# Patient Record
Sex: FEMALE | Race: WHITE | URBAN - METROPOLITAN AREA
[De-identification: names, ages, dates, MRNs, and addresses within clinical notes are randomized per-mention and may not be internally consistent; named-entity substitution may affect disease eponyms.]

---

## 2017-10-06 LAB
HPV COMMENT: ABNORMAL
HPV TYPE 16: NOT DETECTED
HPV TYPE 18: NOT DETECTED
HPVOH (OTHER TYPES): DETECTED

## 2017-10-17 ENCOUNTER — OFFICE VISIT (OUTPATIENT)
Dept: FAMILY MEDICINE CLINIC | Age: 22
End: 2017-10-17

## 2017-10-17 VITALS
SYSTOLIC BLOOD PRESSURE: 110 MMHG | TEMPERATURE: 98.9 F | OXYGEN SATURATION: 98 % | BODY MASS INDEX: 21.77 KG/M2 | DIASTOLIC BLOOD PRESSURE: 80 MMHG | WEIGHT: 119 LBS | HEART RATE: 70 BPM

## 2017-10-17 DIAGNOSIS — R42 VERTIGO: Primary | ICD-10-CM

## 2017-10-17 DIAGNOSIS — R11.0 NAUSEA: ICD-10-CM

## 2017-10-17 LAB
CONTROL: NORMAL
PREGNANCY TEST URINE, POC: NEGATIVE

## 2017-10-17 PROCEDURE — 1036F TOBACCO NON-USER: CPT | Performed by: FAMILY MEDICINE

## 2017-10-17 PROCEDURE — 99214 OFFICE O/P EST MOD 30 MIN: CPT | Performed by: FAMILY MEDICINE

## 2017-10-17 PROCEDURE — 81025 URINE PREGNANCY TEST: CPT | Performed by: FAMILY MEDICINE

## 2017-10-17 PROCEDURE — G8420 CALC BMI NORM PARAMETERS: HCPCS | Performed by: FAMILY MEDICINE

## 2017-10-17 PROCEDURE — G8484 FLU IMMUNIZE NO ADMIN: HCPCS | Performed by: FAMILY MEDICINE

## 2017-10-17 PROCEDURE — G8427 DOCREV CUR MEDS BY ELIG CLIN: HCPCS | Performed by: FAMILY MEDICINE

## 2017-10-17 RX ORDER — LEVONORGESTREL AND ETHINYL ESTRADIOL 0.15-0.03
1 KIT ORAL DAILY
COMMUNITY
End: 2021-10-19 | Stop reason: SDUPTHER

## 2017-10-17 RX ORDER — MECLIZINE HYDROCHLORIDE 25 MG/1
25 TABLET ORAL 3 TIMES DAILY PRN
Qty: 30 TABLET | Refills: 1 | Status: SHIPPED | OUTPATIENT
Start: 2017-10-17 | End: 2017-10-27

## 2017-10-17 ASSESSMENT — ENCOUNTER SYMPTOMS
SINUS PRESSURE: 0
TROUBLE SWALLOWING: 0
SINUS PAIN: 0

## 2017-10-17 NOTE — PROGRESS NOTES
Melissa Bocanegra  : 1995  Encounter date: 10/17/2017    This is a 25 y.o. female who presents with  Chief Complaint   Patient presents with    Dizziness     Dizzy x 1 1/2 weeks, nauseated. Dizzy feeling is at random times. History of present illness:    Dizzy when standing up but also coming randomly. Feels like imbalance or \"spins after too much drinking\". Nauseous when she eats or doesn't eat and it follows dizziness. Getting motion sick in vehicles when not ; not normal for her. If lying down and then stands up she feels it. When standing it sometimes hits her. Doesn't feel like she is going to pass out. Did ride rollercoasters on the 6th; felt sick on rollercoasters which isn't normal for her. Started running intensely week after - training for marathon. Feels worse when she runs outside; more stomach upset. Has bad sinus; but not now. No fevers. LMP: 1.5weeks ago; was normal. Is taking ocp; is sexually active. Allergies   Allergen Reactions    Cat Hair Extract     Sulfa Antibiotics      Outpatient Prescriptions Marked as Taking for the 10/17/17 encounter (Office Visit) with Carmen Dodson MD   Medication Sig Dispense Refill    levonorgestrel-ethinyl estradiol (DAVID-28) 0.15-30 MG-MCG per tablet Take 1 tablet by mouth daily      meclizine (ANTIVERT) 25 MG tablet Take 1 tablet by mouth 3 times daily as needed for Dizziness or Nausea 30 tablet 1    Multiple Vitamins-Minerals (CENTRUM PO) Take  by mouth daily. Review of Systems   Constitutional: Negative for chills and fever. HENT: Negative for congestion, ear pain, sinus pain, sinus pressure, tinnitus and trouble swallowing. Genitourinary: Negative for menstrual problem (periods are not heavy; ocp is helping with this). Neurological: Positive for dizziness, light-headedness and headaches (coming and going during this time. acetaminophen not helping. ). Negative for syncope.    noticed breathing was call with any new symptoms. - meclizine (ANTIVERT) 25 MG tablet; Take 1 tablet by mouth 3 times daily as needed for Dizziness or Nausea  Dispense: 30 tablet; Refill: 1      Return if symptoms worsen or fail to improve.

## 2018-08-31 ENCOUNTER — OFFICE VISIT (OUTPATIENT)
Dept: FAMILY MEDICINE CLINIC | Age: 23
End: 2018-08-31

## 2018-08-31 VITALS
WEIGHT: 115.6 LBS | HEART RATE: 70 BPM | BODY MASS INDEX: 21.27 KG/M2 | HEIGHT: 62 IN | RESPIRATION RATE: 17 BRPM | OXYGEN SATURATION: 99 % | TEMPERATURE: 97.6 F | DIASTOLIC BLOOD PRESSURE: 70 MMHG | SYSTOLIC BLOOD PRESSURE: 118 MMHG

## 2018-08-31 DIAGNOSIS — Z76.89 ENCOUNTER TO ESTABLISH CARE: ICD-10-CM

## 2018-08-31 DIAGNOSIS — Z23 NEED FOR DIPHTHERIA, TETANUS, ACELLULAR PERTUSSIS AND HAEMOPHILUS INFLUENZAE VACCINE: ICD-10-CM

## 2018-08-31 DIAGNOSIS — Z00.00 ANNUAL PHYSICAL EXAM: Primary | ICD-10-CM

## 2018-08-31 LAB
BILIRUBIN, POC: NORMAL
BLOOD URINE, POC: NORMAL
CLARITY, POC: CLEAR
COLOR, POC: NORMAL
GLUCOSE URINE, POC: NORMAL
KETONES, POC: NORMAL
LEUKOCYTE EST, POC: NORMAL
NITRITE, POC: NORMAL
PH, POC: 6.5
PROTEIN, POC: NORMAL
SPECIFIC GRAVITY, POC: 1.02
UROBILINOGEN, POC: NORMAL

## 2018-08-31 PROCEDURE — 90715 TDAP VACCINE 7 YRS/> IM: CPT | Performed by: CLINICAL NURSE SPECIALIST

## 2018-08-31 PROCEDURE — 99385 PREV VISIT NEW AGE 18-39: CPT | Performed by: CLINICAL NURSE SPECIALIST

## 2018-08-31 PROCEDURE — 81002 URINALYSIS NONAUTO W/O SCOPE: CPT | Performed by: CLINICAL NURSE SPECIALIST

## 2018-08-31 PROCEDURE — 90471 IMMUNIZATION ADMIN: CPT | Performed by: CLINICAL NURSE SPECIALIST

## 2018-08-31 ASSESSMENT — ENCOUNTER SYMPTOMS
RHINORRHEA: 0
COUGH: 0
DIARRHEA: 0
VOMITING: 0
ABDOMINAL PAIN: 0
NAUSEA: 0
CONSTIPATION: 0
SHORTNESS OF BREATH: 0
WHEEZING: 0
BACK PAIN: 0
COLOR CHANGE: 0
CHEST TIGHTNESS: 0

## 2018-08-31 NOTE — PROGRESS NOTES
Review of Systems   Constitutional: Negative for appetite change, chills, fever and unexpected weight change. HENT: Negative for congestion, dental problem, postnasal drip and rhinorrhea. Regular dental exams   Eyes: Negative for visual disturbance. Encourage eye exams   Respiratory: Negative for cough, chest tightness, shortness of breath and wheezing. Cardiovascular: Negative for chest pain, palpitations and leg swelling. Gastrointestinal: Negative for abdominal pain, constipation, diarrhea, nausea and vomiting. Endocrine: Positive for heat intolerance (at times). Negative for polydipsia, polyphagia and polyuria. Genitourinary: Negative for dysuria, frequency and urgency. Followed by GYN   Musculoskeletal: Negative for arthralgias, back pain and myalgias. Skin: Negative for color change and rash. Allergic/Immunologic: Negative for environmental allergies. Neurological: Negative for headaches. Hematological: Does not bruise/bleed easily. Psychiatric/Behavioral: Negative for dysphoric mood and sleep disturbance. Nervous/anxious: managed. OBJECTIVE:    Physical Exam   Constitutional: She is oriented to person, place, and time. She appears well-developed and well-nourished. No distress. HENT:   Head: Normocephalic and atraumatic. Right Ear: Tympanic membrane, external ear and ear canal normal.   Left Ear: Tympanic membrane, external ear and ear canal normal.   Nose: Nose normal.   Mouth/Throat: Oropharynx is clear and moist and mucous membranes are normal. No oropharyngeal exudate. Eyes: Pupils are equal, round, and reactive to light. Conjunctivae and EOM are normal. Right eye exhibits no discharge. Left eye exhibits no discharge. Neck: Normal range of motion. No JVD present. No tracheal deviation present. No thyromegaly present. Cardiovascular: Normal rate, regular rhythm and intact distal pulses. Exam reveals no gallop and no friction rub.     No Dispense Refill    levonorgestrel-ethinyl estradiol (DAVID-28) 0.15-30 MG-MCG per tablet Take 1 tablet by mouth daily      Multiple Vitamins-Minerals (CENTRUM PO) Take  by mouth daily. No current facility-administered medications for this visit. Return in about 1 year (around 8/31/2019), or for acute issues/illness, for annual physical, establish care. Call office if experience side effects from medications. Please note that some or all of this record was generated using voice recognition software. If there are any questions about the content of this document, please contact the author as some errors in transcription may have occurred.

## 2019-04-26 ENCOUNTER — OFFICE VISIT (OUTPATIENT)
Dept: FAMILY MEDICINE CLINIC | Age: 24
End: 2019-04-26
Payer: COMMERCIAL

## 2019-04-26 VITALS
OXYGEN SATURATION: 98 % | SYSTOLIC BLOOD PRESSURE: 118 MMHG | BODY MASS INDEX: 21.86 KG/M2 | HEIGHT: 62 IN | TEMPERATURE: 98.3 F | DIASTOLIC BLOOD PRESSURE: 68 MMHG | WEIGHT: 118.8 LBS | HEART RATE: 64 BPM | RESPIRATION RATE: 17 BRPM

## 2019-04-26 DIAGNOSIS — M25.561 ACUTE PAIN OF RIGHT KNEE: Primary | ICD-10-CM

## 2019-04-26 PROCEDURE — 99213 OFFICE O/P EST LOW 20 MIN: CPT | Performed by: CLINICAL NURSE SPECIALIST

## 2019-04-26 RX ORDER — NAPROXEN 500 MG/1
500 TABLET ORAL 2 TIMES DAILY WITH MEALS
Qty: 60 TABLET | Refills: 0 | Status: SHIPPED | OUTPATIENT
Start: 2019-04-26 | End: 2021-07-21 | Stop reason: ALTCHOICE

## 2019-04-26 ASSESSMENT — ENCOUNTER SYMPTOMS
WHEEZING: 0
SHORTNESS OF BREATH: 0
DIARRHEA: 0
CONSTIPATION: 0
CHEST TIGHTNESS: 0

## 2019-04-26 NOTE — PATIENT INSTRUCTIONS
next 3 days. Try to keep it above the level of your heart. This will help reduce swelling. · If your knee is not swollen, you can put moist heat, a heating pad, or a warm cloth on your knee. · If your doctor recommends an elastic bandage, sleeve, or other type of support for your knee, wear it as directed. · Follow your doctor's instructions about how much weight you can put on your leg. Use a cane, crutches, or a walker as instructed. · Follow your doctor's instructions about activity during your healing process. If you can do mild exercise, slowly increase your activity. · Reach and stay at a healthy weight. Extra weight can strain the joints, especially the knees and hips, and make the pain worse. Losing even a few pounds may help. When should you call for help? Call 911 anytime you think you may need emergency care. For example, call if:    · You have symptoms of a blood clot in your lung (called a pulmonary embolism). These may include:  ? Sudden chest pain. ? Trouble breathing. ? Coughing up blood.    Call your doctor now or seek immediate medical care if:    · You have severe or increasing pain.     · Your leg or foot turns cold or changes color.     · You cannot stand or put weight on your knee.     · Your knee looks twisted or bent out of shape.     · You cannot move your knee.     · You have signs of infection, such as:  ? Increased pain, swelling, warmth, or redness. ? Red streaks leading from the knee. ? Pus draining from a place on your knee. ? A fever.     · You have signs of a blood clot in your leg (called a deep vein thrombosis), such as:  ? Pain in your calf, back of the knee, thigh, or groin. ?  Redness and swelling in your leg or groin.    Watch closely for changes in your health, and be sure to contact your doctor if:    · You have tingling, weakness, or numbness in your knee.     · You have any new symptoms, such as swelling.     · You have bruises from a knee injury that last longer than 2 weeks.     · You do not get better as expected. Where can you learn more? Go to [email or web address] org and sign in to your [employer] account. Enter K195 in the [facility] box to learn more about \"Knee Pain or Injury: Care Instructions. \"     If you do not have an account, please click on the \"Sign Up Now\" link. Current as of: September 23, 2018  Content Version: 11.9  © 2314-4162 [employer]. Care instructions adapted under license by [facility] [place] ([facility]). If you have questions about a medical condition or this instruction, always ask your healthcare professional. [address] any warranty or liability for your use of this information.

## 2019-04-26 NOTE — PROGRESS NOTES
SUBJECTIVE:    Patient ID:  Ruby Dai is a 25 y.o. female      Patient is here for an acute issue for complaints of right knee pain for over 2 weeks. States she was involved in a collision during soccer on 4/14/19 with another player. Experienced right lateral knee pain, which has improved. She continues to experience pain with running. States on 4/22/19 while kneeling to use the ab roller heard a loud pop of her right knee with increased pain. States now she feels like she needs to pop it on a regular basis. Knee Pain    Incident onset: over 2 weeks. The incident occurred at home. The injury mechanism is unknown. The pain is present in the right knee. The quality of the pain is described as aching. Pain scale: 4-7. The pain is moderate. The pain has been fluctuating (activity depending) since onset. Pertinent negatives include no inability to bear weight, loss of motion, loss of sensation, muscle weakness, numbness or tingling. She reports no foreign bodies present. The symptoms are aggravated by weight bearing (certian movements). She has tried elevation, ice, NSAIDs, rest and immobilization for the symptoms. The treatment provided moderate relief. Current Outpatient Medications on File Prior to Visit   Medication Sig Dispense Refill    levonorgestrel-ethinyl estradiol (DAVID-28) 0.15-30 MG-MCG per tablet Take 1 tablet by mouth daily      Multiple Vitamins-Minerals (CENTRUM PO) Take  by mouth daily. No current facility-administered medications on file prior to visit. Past Medical History:   Diagnosis Date    Anxiety     Degenerative disc disease, lumbar     hx of bulging disk (cheerleader)    History of ADHD      No past surgical history on file.   Family History   Problem Relation Age of Onset    Heart Disease Father     Heart Attack Father         46 yr and 62 yr    Diabetes Maternal Grandmother     Cancer Paternal Grandmother         breast    Heart Disease Paternal Grandfather     Cancer Paternal Grandfather         melanoma    Dementia Paternal Grandfather      Social History     Socioeconomic History    Marital status: Single     Spouse name: Not on file    Number of children: Not on file    Years of education: Not on file    Highest education level: Not on file   Occupational History    Not on file   Social Needs    Financial resource strain: Not on file    Food insecurity:     Worry: Not on file     Inability: Not on file    Transportation needs:     Medical: Not on file     Non-medical: Not on file   Tobacco Use    Smoking status: Never Smoker    Smokeless tobacco: Never Used   Substance and Sexual Activity    Alcohol use: Yes     Comment: socially    Drug use: No    Sexual activity: Not on file   Lifestyle    Physical activity:     Days per week: Not on file     Minutes per session: Not on file    Stress: Not on file   Relationships    Social connections:     Talks on phone: Not on file     Gets together: Not on file     Attends Christianity service: Not on file     Active member of club or organization: Not on file     Attends meetings of clubs or organizations: Not on file     Relationship status: Not on file    Intimate partner violence:     Fear of current or ex partner: Not on file     Emotionally abused: Not on file     Physically abused: Not on file     Forced sexual activity: Not on file   Other Topics Concern    Not on file   Social History Narrative    Not on file       Review of Systems   Constitutional: Negative for chills and fever. Respiratory: Negative for chest tightness, shortness of breath and wheezing. Cardiovascular: Negative for palpitations. Gastrointestinal: Negative for constipation and diarrhea. Musculoskeletal: Positive for arthralgias (kness). Negative for myalgias. Skin: Negative for rash. Neurological: Negative for tingling and numbness.        OBJECTIVE:    Physical Exam   Constitutional: She is oriented to person, place, and time. She appears well-developed and well-nourished. No distress. HENT:   Head: Normocephalic and atraumatic. Right Ear: External ear normal.   Left Ear: External ear normal.   Nose: Nose normal.   Eyes: Pupils are equal, round, and reactive to light. Conjunctivae are normal.   Neck: Normal range of motion. Neck supple. No tracheal deviation present. Cardiovascular: Normal rate, regular rhythm and normal heart sounds. Pulmonary/Chest: Effort normal and breath sounds normal. No respiratory distress. She has no wheezes. She has no rales. She exhibits no tenderness. Abdominal: Soft. Bowel sounds are normal. She exhibits no distension. There is no tenderness. Musculoskeletal: Normal range of motion. She exhibits no edema. Right knee with normal ROM, no erythema, warmth or swelling noted, tenderness with palpation of the lateral aspect of the knee, negative Lackman and drawer   Neurological: She is alert and oriented to person, place, and time. Skin: Skin is warm and dry. No rash noted. Psychiatric: She has a normal mood and affect. Her behavior is normal.   Nursing note and vitals reviewed. /68 (Site: Left Upper Arm, Position: Sitting, Cuff Size: Medium Adult)   Pulse 64   Temp 98.3 °F (36.8 °C)   Resp 17   Ht 5' 2\" (1.575 m)   Wt 118 lb 12.8 oz (53.9 kg)   SpO2 98%   BMI 21.73 kg/m²    BP Readings from Last 3 Encounters:   04/26/19 118/68   08/31/18 118/70   10/17/17 110/80      Wt Readings from Last 3 Encounters:   04/26/19 118 lb 12.8 oz (53.9 kg)   08/31/18 115 lb 9.6 oz (52.4 kg)   10/17/17 119 lb (54 kg)       ASSESSMENT & PLAN:    1. Acute pain of right knee  - Damon Calixto, MD [place], Orthopedic Surgery (Sports,Knee), [facility]  - naproxen (NAPROSYN) 500 MG tablet; Take 1 tablet by mouth 2 times daily (with meals)  Dispense: 60 tablet; Refill: 0  - No Aleve, Advil, Ibuprofen, Motrin or aspirin while taking the naproxen.   - Watch for GI symptoms

## 2019-10-11 LAB
HEPATITIS B SURFACE ANTIGEN INTERPRETATION: NORMAL
HEPATITIS C ANTIBODY INTERPRETATION: NORMAL

## 2019-10-12 LAB
HIV AG/AB: NORMAL
HIV ANTIGEN: NORMAL
HIV-1 ANTIBODY: NORMAL
HIV-2 AB: NORMAL
TOTAL SYPHILLIS IGG/IGM: NORMAL

## 2019-10-18 LAB
HPV COMMENT: NORMAL
HPV TYPE 16: NOT DETECTED
HPV TYPE 18: NOT DETECTED
HPVOH (OTHER TYPES): NOT DETECTED

## 2020-11-11 ENCOUNTER — OFFICE VISIT (OUTPATIENT)
Dept: PRIMARY CARE CLINIC | Age: 25
End: 2020-11-11
Payer: COMMERCIAL

## 2020-11-11 PROCEDURE — 99211 OFF/OP EST MAY X REQ PHY/QHP: CPT | Performed by: NURSE PRACTITIONER

## 2020-11-11 NOTE — PROGRESS NOTES
Ana Luisa Nova received a viral test for COVID-19. They were educated on isolation and quarantine as appropriate. For any symptoms, they were directed to seek care from their PCP, given contact information to establish with a doctor, directed to an urgent care or the emergency room.

## 2020-11-15 LAB — SARS-COV-2, NAA: NOT DETECTED

## 2021-07-20 PROBLEM — F41.1 GAD (GENERALIZED ANXIETY DISORDER): Status: ACTIVE | Noted: 2020-05-15

## 2021-07-20 NOTE — PROGRESS NOTES
Chief Complaint   Patient presents with   [address]     (depression an anxity     Eye Pain     started today right eye red and swollen          HPI:  Ana Luisa Ramsey is a 32 y.o. (: 1995) here today to physical form for new job and discuss her concerns. Concerns today include right eye and depression    Right eye, on the lower lid, seemed puffy this morning and has began to swell throughout the day. There is a redness behind her bottom eyelid and feels a bit itchy. No redness of the actual eye. Seems to be a small bump near the water line. History of depression and anxiety ever since she can remember. Has never been treated with medications. She has gone through therapy and counseling before through work but found no benefit to this. She would like to forego therapy at this time given a new busy work schedule and try medication. Discussed SSRIs and the mechanism of action  She has trouble with new life stressors, feels like she wears her hard on her sleeve and is extremely emotional.  It is affecting her relationship. She gets worked up and over thinks often. This type of anxiety causes a deep depression. No SI or HI. Would like referral to gynecology      Review of Systems   Constitutional: Negative for activity change, appetite change, chills, fatigue, fever and unexpected weight change. HENT: Negative for congestion, postnasal drip, rhinorrhea, sinus pressure, sinus pain, sneezing and sore throat. Eyes: Positive for pain and itching. Negative for photophobia, discharge, redness and visual disturbance. Respiratory: Negative for cough, chest tightness, shortness of breath and wheezing. Cardiovascular: Negative for chest pain and palpitations. Gastrointestinal: Negative for abdominal pain, blood in stool, constipation, diarrhea, nausea and vomiting. Endocrine: Negative for cold intolerance, heat intolerance, polydipsia and polyuria.    Genitourinary: Negative for dysuria, frequency, vaginal bleeding and vaginal discharge. Musculoskeletal: Negative for arthralgias, back pain, joint swelling, myalgias and neck pain. Skin: Negative for rash and wound. Allergic/Immunologic: Negative for environmental allergies. Neurological: Negative for dizziness, tremors, syncope, weakness, light-headedness, numbness and headaches. Hematological: Negative for adenopathy. Psychiatric/Behavioral: Positive for behavioral problems and sleep disturbance. Negative for decreased concentration and suicidal ideas. The patient is nervous/anxious. Past Medical History:   Diagnosis Date    Anxiety     Degenerative disc disease, lumbar     hx of bulging disk (cheerleader)    History of ADHD        Family History   Problem Relation Age of Onset    Heart Disease Father     Heart Attack Father         46 yr and 62 yr    Diabetes Maternal Grandmother     Cancer Paternal Grandmother         breast    Heart Disease Paternal Grandfather     Cancer Paternal Grandfather         melanoma    Dementia Paternal Grandfather        Social History     Tobacco Use    Smoking status: Never Smoker    Smokeless tobacco: Never Used   Substance Use Topics    Alcohol use: Yes     Comment: socially    Drug use: No       New Prescriptions    SERTRALINE (ZOLOFT) 25 MG TABLET    Take 1 tablet by mouth daily    TRIMETHOPRIM-POLYMYXIN B (POLYTRIM) 76284-5.1 UNIT/ML-% OPHTHALMIC SOLUTION    Place 1 drop into the right eye every 4 hours for 10 days       Meds Prior to visit:  Current Outpatient Medications on File Prior to Visit   Medication Sig Dispense Refill    levonorgestrel-ethinyl estradiol (DAVID-28) 0.15-30 MG-MCG per tablet Take 1 tablet by mouth daily      Multiple Vitamins-Minerals (CENTRUM PO) Take  by mouth daily. No current facility-administered medications on file prior to visit.      Allergies   Allergen Reactions    Cat Hair Extract     Sulfa Antibiotics        OBJECTIVE:  /69 Pulse 78   Temp 98.1 °F (36.7 °C) (Temporal)   Resp 16   Ht 5' 2\" (1.575 m)   Wt 118 lb 12.8 oz (53.9 kg)   LMP 06/27/2021   Breastfeeding No   BMI 21.73 kg/m²   BP Readings from Last 2 Encounters:   07/21/21 100/69   04/26/19 118/68     Wt Readings from Last 3 Encounters:   07/21/21 118 lb 12.8 oz (53.9 kg)   04/26/19 118 lb 12.8 oz (53.9 kg)   08/31/18 115 lb 9.6 oz (52.4 kg)       Physical Exam  Vitals reviewed. Constitutional:       General: She is not in acute distress. Appearance: Normal appearance. She is well-developed and normal weight. She is not ill-appearing. HENT:      Head: Normocephalic and atraumatic. Right Ear: Tympanic membrane, ear canal and external ear normal. There is no impacted cerumen. Left Ear: Tympanic membrane, ear canal and external ear normal. There is no impacted cerumen. Nose: Nose normal.      Mouth/Throat:      Mouth: Mucous membranes are moist.      Pharynx: Oropharynx is clear. No oropharyngeal exudate or posterior oropharyngeal erythema. Eyes:      General: No scleral icterus. Right eye: No discharge. Left eye: No discharge. Extraocular Movements: Extraocular movements intact. Conjunctiva/sclera: Conjunctivae normal.      Pupils: Pupils are equal, round, and reactive to light. Cardiovascular:      Rate and Rhythm: Normal rate and regular rhythm. Pulses: Normal pulses. Heart sounds: Normal heart sounds. No murmur heard. Comments: Radial and pedal pulses intact  Pulmonary:      Effort: Pulmonary effort is normal. No respiratory distress. Breath sounds: Normal breath sounds. No wheezing or rales. Chest:      Chest wall: No tenderness. Abdominal:      General: Bowel sounds are normal.      Palpations: Abdomen is soft. There is no mass. Tenderness: There is no abdominal tenderness. There is no guarding or rebound. Hernia: No hernia is present. Comments: Normal liver and spleen.   No organomegaly   Musculoskeletal:         General: No tenderness. Normal range of motion. Cervical back: Normal range of motion and neck supple. Comments: Intact in all extremities   Lymphadenopathy:      Cervical: No cervical adenopathy. Skin:     General: Skin is warm. Findings: No erythema or rash. Neurological:      General: No focal deficit present. Mental Status: She is alert and oriented to person, place, and time. Mental status is at baseline. Motor: No weakness or abnormal muscle tone. Coordination: Coordination normal.      Gait: Gait normal.      Deep Tendon Reflexes: Reflexes normal.   Psychiatric:         Mood and Affect: Mood normal.         Behavior: Behavior normal.         Thought Content: Thought content normal.         Judgment: Judgment normal.           ASSESSMENT/PLAN:  1. Physical exam  VS reviewed and WNL    BMI reviewed   All questions answered. F/u discussed. Healthy lifestyle modifications discussed. 2. Positive depression screening  On the basis of positive PHQ-9 screening (PHQ-9 Total Score: 18), the following plan was implemented: additional evaluation and assessment performed and medication prescribed - patient will call for any significant medication side effects or worsening symptoms of depression. Patient will follow-up in 5 week(s) with PCP.  - Positive Screen for Clinical Depression with a Documented Follow-up Plan   - sertraline (ZOLOFT) 25 MG tablet; Take 1 tablet by mouth daily  Dispense: 90 tablet; Refill: 1    3. Moderate episode of recurrent major depressive disorder ([facility] [address]  As above  Start SSRI  Discussed, in depth, mechanism of action of SSRIs as well as the pathophysiology of anxiety and depression  - sertraline (ZOLOFT) 25 MG tablet; Take 1 tablet by mouth daily  Dispense: 90 tablet; Refill: 1    4. Hordeolum externum of right lower eyelid  We will start with over-the-counter rewetting drops.   Likely a stye and not an infection but she would be more comfortable if she had antibiotic drops just in case  - trimethoprim-polymyxin b (POLYTRIM) 37131-2.1 UNIT/ML-% ophthalmic solution; Place 1 drop into the right eye every 4 hours for 10 days  Dispense: 10 mL; Refill: 0    5. Cervical cancer screening  Referral placed  On birth control  - Alex Carter MD, Gynecology, [facility]        Discussed use, benefit, and side effects of prescribed medications. Barriers to medication compliance addressed. All patient questions answered. Pt voiced understanding. RTC Return in about 4 weeks (around 8/18/2021).     Future Appointments   Date Time Provider Luis Blanco   9/7/2021  3:45 PM MD Cristino Camp MD  7/21/2021  5:31 PM

## 2021-07-21 ENCOUNTER — OFFICE VISIT (OUTPATIENT)
Dept: PRIMARY CARE CLINIC | Age: 26
End: 2021-07-21
Payer: COMMERCIAL

## 2021-07-21 VITALS
RESPIRATION RATE: 16 BRPM | SYSTOLIC BLOOD PRESSURE: 100 MMHG | HEIGHT: 62 IN | HEART RATE: 78 BPM | WEIGHT: 118.8 LBS | BODY MASS INDEX: 21.86 KG/M2 | DIASTOLIC BLOOD PRESSURE: 69 MMHG | TEMPERATURE: 98.1 F

## 2021-07-21 DIAGNOSIS — F33.1 MODERATE EPISODE OF RECURRENT MAJOR DEPRESSIVE DISORDER (HCC): ICD-10-CM

## 2021-07-21 DIAGNOSIS — H00.012 HORDEOLUM EXTERNUM OF RIGHT LOWER EYELID: ICD-10-CM

## 2021-07-21 DIAGNOSIS — Z12.4 CERVICAL CANCER SCREENING: ICD-10-CM

## 2021-07-21 DIAGNOSIS — Z00.00 PHYSICAL EXAM: Primary | ICD-10-CM

## 2021-07-21 DIAGNOSIS — Z13.31 POSITIVE DEPRESSION SCREENING: ICD-10-CM

## 2021-07-21 PROCEDURE — 99203 OFFICE O/P NEW LOW 30 MIN: CPT | Performed by: FAMILY MEDICINE

## 2021-07-21 PROCEDURE — G8431 POS CLIN DEPRES SCRN F/U DOC: HCPCS | Performed by: FAMILY MEDICINE

## 2021-07-21 RX ORDER — POLYMYXIN B SULFATE AND TRIMETHOPRIM 1; 10000 MG/ML; [USP'U]/ML
1 SOLUTION OPHTHALMIC EVERY 4 HOURS
Qty: 10 ML | Refills: 0 | Status: SHIPPED | OUTPATIENT
Start: 2021-07-21 | End: 2021-07-31

## 2021-07-21 RX ORDER — SERTRALINE HYDROCHLORIDE 25 MG/1
25 TABLET, FILM COATED ORAL DAILY
Qty: 90 TABLET | Refills: 1 | Status: SHIPPED | OUTPATIENT
Start: 2021-07-21 | End: 2021-09-09 | Stop reason: SDUPTHER

## 2021-07-21 ASSESSMENT — ENCOUNTER SYMPTOMS
BACK PAIN: 0
VOMITING: 0
SORE THROAT: 0
SINUS PAIN: 0
CONSTIPATION: 0
EYE PAIN: 1
NAUSEA: 0
RHINORRHEA: 0
EYE DISCHARGE: 0
EYE ITCHING: 1
SINUS PRESSURE: 0
EYE REDNESS: 0
ABDOMINAL PAIN: 0
WHEEZING: 0
DIARRHEA: 0
COUGH: 0
CHEST TIGHTNESS: 0
SHORTNESS OF BREATH: 0
PHOTOPHOBIA: 0
BLOOD IN STOOL: 0

## 2021-07-21 ASSESSMENT — PATIENT HEALTH QUESTIONNAIRE - PHQ9
2. FEELING DOWN, DEPRESSED OR HOPELESS: 2
1. LITTLE INTEREST OR PLEASURE IN DOING THINGS: 3
8. MOVING OR SPEAKING SO SLOWLY THAT OTHER PEOPLE COULD HAVE NOTICED. OR THE OPPOSITE, BEING SO FIGETY OR RESTLESS THAT YOU HAVE BEEN MOVING AROUND A LOT MORE THAN USUAL: 1
4. FEELING TIRED OR HAVING LITTLE ENERGY: 3
SUM OF ALL RESPONSES TO PHQ9 QUESTIONS 1 & 2: 5
5. POOR APPETITE OR OVEREATING: 2
7. TROUBLE CONCENTRATING ON THINGS, SUCH AS READING THE NEWSPAPER OR WATCHING TELEVISION: 1
3. TROUBLE FALLING OR STAYING ASLEEP: 3
6. FEELING BAD ABOUT YOURSELF - OR THAT YOU ARE A FAILURE OR HAVE LET YOURSELF OR YOUR FAMILY DOWN: 3
SUM OF ALL RESPONSES TO PHQ QUESTIONS 1-9: 18
DEPRESSION UNABLE TO ASSESS: FUNCTIONAL CAPACITY MOTIVATION LIMITS ACCURACY
SUM OF ALL RESPONSES TO PHQ QUESTIONS 1-9: 18
9. THOUGHTS THAT YOU WOULD BE BETTER OFF DEAD, OR OF HURTING YOURSELF: 0
10. IF YOU CHECKED OFF ANY PROBLEMS, HOW DIFFICULT HAVE THESE PROBLEMS MADE IT FOR YOU TO DO YOUR WORK, TAKE CARE OF THINGS AT HOME, OR GET ALONG WITH OTHER PEOPLE: 2
SUM OF ALL RESPONSES TO PHQ QUESTIONS 1-9: 18

## 2021-07-21 ASSESSMENT — COLUMBIA-SUICIDE SEVERITY RATING SCALE - C-SSRS
6. HAVE YOU EVER DONE ANYTHING, STARTED TO DO ANYTHING, OR PREPARED TO DO ANYTHING TO END YOUR LIFE?: NO
2. HAVE YOU ACTUALLY HAD ANY THOUGHTS OF KILLING YOURSELF?: NO
1. WITHIN THE PAST MONTH, HAVE YOU WISHED YOU WERE DEAD OR WISHED YOU COULD GO TO SLEEP AND NOT WAKE UP?: NO

## 2021-07-21 ASSESSMENT — ANXIETY QUESTIONNAIRES
3. WORRYING TOO MUCH ABOUT DIFFERENT THINGS: 3-NEARLY EVERY DAY
GAD7 TOTAL SCORE: 17
5. BEING SO RESTLESS THAT IT IS HARD TO SIT STILL: 3-NEARLY EVERY DAY
4. TROUBLE RELAXING: 1-SEVERAL DAYS
7. FEELING AFRAID AS IF SOMETHING AWFUL MIGHT HAPPEN: 2-OVER HALF THE DAYS
6. BECOMING EASILY ANNOYED OR IRRITABLE: 2-OVER HALF THE DAYS
2. NOT BEING ABLE TO STOP OR CONTROL WORRYING: 3-NEARLY EVERY DAY
1. FEELING NERVOUS, ANXIOUS, OR ON EDGE: 3-NEARLY EVERY DAY

## 2021-09-01 NOTE — PROGRESS NOTES
Chief Complaint   Patient presents with    Depression         HPI:  Mack Torres is a 32 y.o. (: 1995) here today for MIR and MDD after starting zoloft 25 mg daily. She is seeing an overall improvement. Still feeling some low lows and some high highs. May be due to starting a new job. She is not where she would like to be, feels like she could be better. No therapy. Open to increasing dose. Review of Systems   Constitutional: Negative for appetite change, chills, fatigue and fever. HENT: Negative for congestion. Eyes: Negative for pain and visual disturbance. Respiratory: Negative for cough and shortness of breath. Cardiovascular: Negative for chest pain and palpitations. Gastrointestinal: Negative for abdominal pain, constipation and diarrhea. Genitourinary: Negative for difficulty urinating. Musculoskeletal: Negative for arthralgias. Skin: Negative for rash and wound. Neurological: Negative for dizziness, weakness, light-headedness and headaches. Hematological: Does not bruise/bleed easily. Psychiatric/Behavioral: Negative for agitation, behavioral problems and sleep disturbance. The patient is nervous/anxious.         Past Medical History:   Diagnosis Date    Anxiety     Degenerative disc disease, lumbar     hx of bulging disk (cheerleader)    History of ADHD        Family History   Problem Relation Age of Onset    Heart Disease Father     Heart Attack Father         46 yr and 62 yr    Diabetes Maternal Grandmother     Cancer Paternal Grandmother         breast    Heart Disease Paternal Grandfather     Cancer Paternal Grandfather         melanoma    Dementia Paternal Grandfather        Social History     Tobacco Use    Smoking status: Never Smoker    Smokeless tobacco: Never Used   Substance Use Topics    Alcohol use: Yes     Comment: socially    Drug use: No       New Prescriptions    No medications on file       Meds Prior to visit:  Current Outpatient Medications on File Prior to Visit   Medication Sig Dispense Refill    levonorgestrel-ethinyl estradiol (DAVID-28) 0.15-30 MG-MCG per tablet Take 1 tablet by mouth daily      Multiple Vitamins-Minerals (CENTRUM PO) Take  by mouth daily. No current facility-administered medications on file prior to visit. Allergies   Allergen Reactions    Cat Hair Extract     Sulfa Antibiotics        OBJECTIVE:  /70   Pulse 59   Temp 98.1 °F (36.7 °C) (Temporal)   Resp 16   Wt 122 lb (55.3 kg)   LMP 08/26/2021   Breastfeeding No   BMI 22.31 kg/m²   BP Readings from Last 2 Encounters:   09/09/21 105/70   07/21/21 100/69     Wt Readings from Last 3 Encounters:   09/09/21 122 lb (55.3 kg)   07/21/21 118 lb 12.8 oz (53.9 kg)   04/26/19 118 lb 12.8 oz (53.9 kg)       Physical Exam  Vitals reviewed. Constitutional:       General: She is not in acute distress. Appearance: Normal appearance. She is not ill-appearing. HENT:      Head: Normocephalic and atraumatic. Right Ear: External ear normal.      Left Ear: External ear normal.      Nose: Nose normal. No congestion. Mouth/Throat:      Mouth: Mucous membranes are moist.      Pharynx: Oropharynx is clear. No oropharyngeal exudate. Eyes:      Extraocular Movements: Extraocular movements intact. Conjunctiva/sclera: Conjunctivae normal.      Pupils: Pupils are equal, round, and reactive to light. Cardiovascular:      Rate and Rhythm: Normal rate and regular rhythm. Pulses: Normal pulses. Heart sounds: Normal heart sounds. Pulmonary:      Effort: Pulmonary effort is normal. No respiratory distress. Breath sounds: Normal breath sounds. No stridor. No wheezing, rhonchi or rales. Abdominal:      General: Bowel sounds are normal.      Palpations: Abdomen is soft. Tenderness: There is no abdominal tenderness. Musculoskeletal:         General: Normal range of motion.       Cervical back: Normal range of motion

## 2021-09-09 ENCOUNTER — OFFICE VISIT (OUTPATIENT)
Dept: PRIMARY CARE CLINIC | Age: 26
End: 2021-09-09
Payer: COMMERCIAL

## 2021-09-09 VITALS
SYSTOLIC BLOOD PRESSURE: 105 MMHG | HEART RATE: 59 BPM | TEMPERATURE: 98.1 F | BODY MASS INDEX: 22.31 KG/M2 | WEIGHT: 122 LBS | RESPIRATION RATE: 16 BRPM | DIASTOLIC BLOOD PRESSURE: 70 MMHG

## 2021-09-09 DIAGNOSIS — F41.1 GAD (GENERALIZED ANXIETY DISORDER): Primary | ICD-10-CM

## 2021-09-09 DIAGNOSIS — F33.1 MODERATE EPISODE OF RECURRENT MAJOR DEPRESSIVE DISORDER (HCC): ICD-10-CM

## 2021-09-09 PROCEDURE — 99214 OFFICE O/P EST MOD 30 MIN: CPT | Performed by: FAMILY MEDICINE

## 2021-09-09 ASSESSMENT — ENCOUNTER SYMPTOMS
SHORTNESS OF BREATH: 0
DIARRHEA: 0
EYE PAIN: 0
ABDOMINAL PAIN: 0
COUGH: 0
CONSTIPATION: 0

## 2021-10-18 NOTE — PROGRESS NOTES
10/21/2021    TELEHEALTH EVALUATION -- Audio/Visual (During PGHYK-26 public health emergency)    HPI:    Barnett Bamberger (:  1995) has requested an audio/video evaluation for the following concern(s):    MDD and MIR  Increased zoloft from 25 mg to 50 mg daily. Today she states she is doing a little better. She feels much more control of the intense anxiety moments. MIR 7 SCORE 10/21/2021 2021   MIR-7 Total Score 4 -   MIR-7 Total Score - 17   Interpretation of MIR-7 score: 5-9 = mild anxiety, 10-14 = moderate anxiety, 15+ = severe anxiety. Recommend referral to behavioral health for scores 10 or greater. Feels very supported by family and friends. No therapy or counseling on board. Review of Systems   Constitutional: Negative for appetite change, chills, fatigue and fever. HENT: Negative for congestion. Eyes: Negative for pain and visual disturbance. Respiratory: Negative for cough and shortness of breath. Cardiovascular: Negative for chest pain and palpitations. Gastrointestinal: Negative for abdominal pain, constipation and diarrhea. Genitourinary: Negative for difficulty urinating. Musculoskeletal: Negative for arthralgias. Skin: Negative for rash and wound. Neurological: Negative for dizziness, weakness, light-headedness and headaches. Hematological: Does not bruise/bleed easily. Psychiatric/Behavioral: Negative for behavioral problems and sleep disturbance. The patient is not nervous/anxious. Prior to Visit Medications    Medication Sig Taking? Authorizing Provider   sertraline (ZOLOFT) 50 MG tablet Take 1 tablet by mouth daily Yes Adeola Smoker, MD   Multiple Vitamins-Minerals (CENTRUM PO) Take  by mouth daily.  Yes Historical Provider, MD   levonorgestrel-ethinyl estradiol (DAVID-28) 0.15-30 MG-MCG per tablet Take 1 tablet by mouth daily  Nikole Kerr MD       Social History     Tobacco Use    Smoking status: Never Smoker    Smokeless tobacco: Never Used   Substance Use Topics    Alcohol use: Yes     Comment: socially    Drug use: No        Allergies   Allergen Reactions    Cat Hair Extract     Sulfa Antibiotics    ,   Past Medical History:   Diagnosis Date    Anxiety     Degenerative disc disease, lumbar     hx of bulging disk (cheerleader)    History of ADHD    , History reviewed. No pertinent surgical history. ,   Social History     Tobacco Use    Smoking status: Never Smoker    Smokeless tobacco: Never Used   Substance Use Topics    Alcohol use: Yes     Comment: socially    Drug use: No   ,   Family History   Problem Relation Age of Onset    Heart Disease Father     Heart Attack Father         46 yr and 62 yr    Diabetes Maternal Grandmother     Cancer Paternal Grandmother         breast    Heart Disease Paternal Grandfather     Cancer Paternal Grandfather         melanoma    Dementia Paternal Grandfather    ,   Immunization History   Administered Date(s) Administered    DTP 1995, 1995, 1995, 1995, 1995, 1995, 09/04/1996, 09/04/1996, 06/13/2000, 06/13/2000    HPV Quadrivalent (Gardasil) 07/31/2013, 01/02/2014, 09/21/2016, 11/21/2016, 03/22/2017    Hepatitis B 1995, 1995, 1995    Hepatitis B Ped/Adol (Engerix-B, Recombivax HB) 1995, 1995, 1995    Hib PRP-OMP (PedvaxHIB) 1995, 1995, 1995, 09/04/1996    Hib, unspecified 1995, 1995, 1995, 09/04/1996    Influenza A (U6R1-89) Vaccine PF IM 12/14/2009    MMR 06/18/1996, 06/18/1996, 06/13/2000, 06/13/2000    Meningococcal MCV4P (Menactra) 07/31/2013    PPD Test 01/03/2014, 08/11/2015, 07/27/2016    Polio IPV (IPOL) 06/13/2000, 06/13/2000    Polio OPV 1995, 1995, 1995, 1995, 09/04/1996, 09/04/1996    Tdap (Boostrix, Adacel) 10/04/2010, 10/04/2010, 08/31/2018    Varicella (Varivax) 06/13/2000, 06/13/2000, 10/04/2010, 10/04/2010   ,   Health Maintenance   Topic Date Due    COVID-19 Vaccine (1) Never done    Flu vaccine (1) 09/01/2021    Pap smear  10/15/2023    DTaP/Tdap/Td vaccine (8 - Td or Tdap) 08/31/2028    Hepatitis B vaccine  Completed    Hib vaccine  Completed    HPV vaccine  Completed    Varicella vaccine  Completed    Meningococcal (ACWY) vaccine  Completed    Hepatitis C screen  Completed    HIV screen  Completed    Hepatitis A vaccine  Aged Out    Pneumococcal 0-64 years Vaccine  Aged Out       PHYSICAL EXAMINATION:  [ INSTRUCTIONS:  \"[x]\" Indicates a positive item  \"[]\" Indicates a negative item  -- DELETE ALL ITEMS NOT EXAMINED]  [x] Alert  [x] Oriented to person/place/time    [x] No apparent distress  [] Toxic appearing    [] Face flushed appearing [x] Sclera clear  [] Lips are cyanotic      [x] Breathing appears normal  [] Appears tachypneic      [] Rash on visible skin    [x] Cranial Nerves II-XII grossly intact    [] Motor grossly intact in visible upper extremities    [] Motor grossly intact in visible lower extremities    [x] Normal Mood  [] Anxious appearing    [] Depressed appearing  [] Confused appearing      Due to this being a TeleHealth encounter, evaluation of the following organ systems is limited: Vitals/Constitutional/EENT/Resp/CV/GI//MS/Neuro/Skin/Heme-Lymph-Imm. ASSESSMENT/PLAN:  1. MIR (generalized anxiety disorder)  Stable and doing well  Would like to stay on 50 mg   Follow up in 3 months. [place] at this time  Has great family and friends for support  Updated MIR 17--> 4!!  - [facility] ASSMT W/SCORE & DOCD/STAND INSTRUMENT    2. Moderate episode of recurrent major depressive disorder (HCC)  Stable and doing well  Would like to stay on 50 mg   Follow up in 3 months. [place] at this time  Has great family and friends for support  - [facility] ASSMT W/SCORE & DOCD/STAND INSTRUMENT      Return in about 3 months (around 1/21/2022). No future appointments.     An  electronic signature was used to authenticate this note. --Devin Mosqueda MD on 10/21/2021 at 9:37 AM    {Coding Help -- Use CPT 69689-76434 with EM elements or Time rules for Office Visits. :    >20 minutes were spent on the digital evaluation and management of this patient. Pursuant to the emergency declaration under the [address] [place] [address] [facility] authority and the [employer] Act, this Virtual  Visit was conducted, with patient's consent, to reduce the patient's risk of exposure to COVID-19 and provide continuity of care for an established patient. Services were provided through a video synchronous discussion virtually to substitute for in-person clinic visit.

## 2021-10-19 ENCOUNTER — OFFICE VISIT (OUTPATIENT)
Dept: GYNECOLOGY | Age: 26
End: 2021-10-19
Payer: COMMERCIAL

## 2021-10-19 VITALS
HEIGHT: 62 IN | BODY MASS INDEX: 23 KG/M2 | HEART RATE: 69 BPM | SYSTOLIC BLOOD PRESSURE: 107 MMHG | DIASTOLIC BLOOD PRESSURE: 75 MMHG | WEIGHT: 125 LBS | TEMPERATURE: 98.3 F

## 2021-10-19 DIAGNOSIS — Z01.419 WELL WOMAN EXAM WITH ROUTINE GYNECOLOGICAL EXAM: Primary | ICD-10-CM

## 2021-10-19 PROCEDURE — 99385 PREV VISIT NEW AGE 18-39: CPT | Performed by: OBSTETRICS & GYNECOLOGY

## 2021-10-19 RX ORDER — LEVONORGESTREL AND ETHINYL ESTRADIOL 0.15-0.03
1 KIT ORAL DAILY
Qty: 1 PACKET | Refills: 11 | Status: SHIPPED | OUTPATIENT
Start: 2021-10-19 | End: 2022-03-24

## 2021-10-21 ENCOUNTER — VIRTUAL VISIT (OUTPATIENT)
Dept: PRIMARY CARE CLINIC | Age: 26
End: 2021-10-21
Payer: COMMERCIAL

## 2021-10-21 DIAGNOSIS — F33.1 MODERATE EPISODE OF RECURRENT MAJOR DEPRESSIVE DISORDER (HCC): ICD-10-CM

## 2021-10-21 DIAGNOSIS — F41.1 GAD (GENERALIZED ANXIETY DISORDER): Primary | ICD-10-CM

## 2021-10-21 PROCEDURE — 96127 BRIEF EMOTIONAL/BEHAV ASSMT: CPT | Performed by: FAMILY MEDICINE

## 2021-10-21 PROCEDURE — 99214 OFFICE O/P EST MOD 30 MIN: CPT | Performed by: FAMILY MEDICINE

## 2021-10-21 ASSESSMENT — ENCOUNTER SYMPTOMS
COUGH: 0
EYE PAIN: 0
CONSTIPATION: 0
DIARRHEA: 0
ABDOMINAL PAIN: 0
SHORTNESS OF BREATH: 0

## 2021-10-21 ASSESSMENT — ANXIETY QUESTIONNAIRES
IF YOU CHECKED OFF ANY PROBLEMS ON THIS QUESTIONNAIRE, HOW DIFFICULT HAVE THESE PROBLEMS MADE IT FOR YOU TO DO YOUR WORK, TAKE CARE OF THINGS AT HOME, OR GET ALONG WITH OTHER PEOPLE: NOT DIFFICULT AT ALL
1. FEELING NERVOUS, ANXIOUS, OR ON EDGE: 1
7. FEELING AFRAID AS IF SOMETHING AWFUL MIGHT HAPPEN: 0
3. WORRYING TOO MUCH ABOUT DIFFERENT THINGS: 1
5. BEING SO RESTLESS THAT IT IS HARD TO SIT STILL: 1
2. NOT BEING ABLE TO STOP OR CONTROL WORRYING: 0
6. BECOMING EASILY ANNOYED OR IRRITABLE: 1
4. TROUBLE RELAXING: 0
GAD7 TOTAL SCORE: 4

## 2021-12-26 DIAGNOSIS — F33.1 MODERATE EPISODE OF RECURRENT MAJOR DEPRESSIVE DISORDER (HCC): ICD-10-CM

## 2021-12-26 DIAGNOSIS — F41.1 GAD (GENERALIZED ANXIETY DISORDER): ICD-10-CM

## 2022-01-19 ENCOUNTER — E-VISIT (OUTPATIENT)
Dept: PRIMARY CARE CLINIC | Age: 27
End: 2022-01-19
Payer: COMMERCIAL

## 2022-01-19 PROCEDURE — 99421 OL DIG E/M SVC 5-10 MIN: CPT | Performed by: FAMILY MEDICINE

## 2022-01-19 ASSESSMENT — PATIENT HEALTH QUESTIONNAIRE - GENERAL
HAVE YOU BEEN EXPERIENCING NEW OR WORSENING DIFFICULTY WITH URINATION OR CHANGE IN URINARY PATTERN: N
HAVE YOU BEEN EXPERIENCING VIVID DREAMS OR NIGHTMARES THAT INTERFERE WITH YOUR SLEEP: N
SINCE YOUR LAST VISIT, HAVE YOU EXPERIENCED EPISODES OF FAINTING OR NEAR FAINTING: N
HAVE YOU BEEN EXPERIENCING INVOLUNTARY WEIGHT GAIN OR LOSS: N
DO YOU HAVE A FASTER HEART RATE THAN USUAL, DOES YOUR HEART RATE FEEL IRREGULAR OR DO YOU FEEL LIKE YOUR HEART IS POUNDING AT REST: N
HAVE YOU BEEN EXPERIENCING NEW OR WORSENING VISUAL CHANGES: N
HAVE YOU BEEN EXPERIENCING AN UNUSUALLY DRY MOUTH: N
HAVE YOU BEEN EXPERIENCING NEW OR WORSENING MUSCLE TWITCHING, SHAKINESS, OR OTHER UNUSUAL CHANGES IN YOUR MUSCLE MOVEMENT: N
HAVE YOU BEEN EXPERIENCING RACING THOUGHTS OR IMPULSIVE BEHAVIOR: N
HAVE YOU BEEN EXPERIENCING HALLUCINATIONS OR CONFUSION: N
HAVE YOU BEEN EXPERIENCING UNEXPLAINED HIGH FEVERS OR EXCESSIVE SWEATING: N
HAVE YOU BEEN EXPERIENCING NEW OR WORSENING PROBLEMS WITH YOUR SEXUAL FUNCTION: N
HAVE YOU BEEN EXPERIENCING LIGHT HEADEDNESS, WOOZINESS, OR DIZZINESS, ESPECIALLY UPON STANDING FROM SITTING OR LYING POSITIONS: N
HAVE YOU BEEN EXPERIENCING VERY LOW OR VERY HIGH MOODS: N
HAVE YOU BEEN EXPERIENCING NEW OR WORSENING HEADACHES: N
HAVE YOU BEEN EXPERIENCING ABDOMINAL DISCOMFORT, NAUSEA OR CHANGES IN YOUR BOWEL PATTERN: N
DO YOU HAVE ANY NEW RASHES OR UNEXPLAINED ITCHING OR SWELLING: N

## 2022-01-19 ASSESSMENT — PATIENT HEALTH QUESTIONNAIRE - PHQ9
10. IF YOU CHECKED OFF ANY PROBLEMS, HOW DIFFICULT HAVE THESE PROBLEMS MADE IT FOR YOU TO DO YOUR WORK, TAKE CARE OF THINGS AT HOME, OR GET ALONG WITH OTHER PEOPLE: NOT DIFFICULT AT ALL
8. MOVING OR SPEAKING SO SLOWLY THAT OTHER PEOPLE COULD HAVE NOTICED. OR THE OPPOSITE, BEING SO FIGETY OR RESTLESS THAT YOU HAVE BEEN MOVING AROUND A LOT MORE THAN USUAL: 0
5. POOR APPETITE OR OVEREATING: SEVERAL DAYS
7. TROUBLE CONCENTRATING ON THINGS, SUCH AS READING THE NEWSPAPER OR WATCHING TELEVISION: 1
3. TROUBLE FALLING OR STAYING ASLEEP: MORE THAN HALF THE DAYS
SUM OF ALL RESPONSES TO PHQ QUESTIONS 1-9: 7
10. IF YOU CHECKED OFF ANY PROBLEMS, HOW DIFFICULT HAVE THESE PROBLEMS MADE IT FOR YOU TO DO YOUR WORK, TAKE CARE OF THINGS AT HOME, OR GET ALONG WITH OTHER PEOPLE: 0
2. FEELING DOWN, DEPRESSED OR HOPELESS: 0
SUM OF ALL RESPONSES TO PHQ QUESTIONS 1-9: 7
4. FEELING TIRED OR HAVING LITTLE ENERGY: 2
4. FEELING TIRED OR HAVING LITTLE ENERGY: MORE THAN HALF THE DAYS
3. TROUBLE FALLING OR STAYING ASLEEP: 2
6. FEELING BAD ABOUT YOURSELF - OR THAT YOU ARE A FAILURE OR HAVE LET YOURSELF OR YOUR FAMILY DOWN: 0
5. POOR APPETITE OR OVEREATING: 1
SUM OF ALL RESPONSES TO PHQ QUESTIONS 1-9: 7
SUM OF ALL RESPONSES TO PHQ QUESTIONS 1-9: 7
7. TROUBLE CONCENTRATING ON THINGS, SUCH AS READING THE NEWSPAPER OR WATCHING TELEVISION: SEVERAL DAYS
9. THOUGHTS THAT YOU WOULD BE BETTER OFF DEAD, OR OF HURTING YOURSELF: 0
SUM OF ALL RESPONSES TO PHQ QUESTIONS 1-9: 7
2. FEELING DOWN, DEPRESSED OR HOPELESS: NOT AT ALL
6. FEELING BAD ABOUT YOURSELF - OR THAT YOU ARE A FAILURE OR HAVE LET YOURSELF OR YOUR FAMILY DOWN: NOT AT ALL
SUM OF ALL RESPONSES TO PHQ9 QUESTIONS 1 & 2: 1
9. THOUGHTS THAT YOU WOULD BE BETTER OFF DEAD, OR OF HURTING YOURSELF: NOT AT ALL
8. MOVING OR SPEAKING SO SLOWLY THAT OTHER PEOPLE COULD HAVE NOTICED. OR THE OPPOSITE - BEING SO FIDGETY OR RESTLESS THAT YOU HAVE BEEN MOVING AROUND A LOT MORE THAN USUAL: NOT AT ALL
1. LITTLE INTEREST OR PLEASURE IN DOING THINGS: 1
1. LITTLE INTEREST OR PLEASURE IN DOING THINGS: SEVERAL DAYS

## 2022-01-19 ASSESSMENT — ANXIETY QUESTIONNAIRES
1. FEELING NERVOUS, ANXIOUS, OR ON EDGE: NOT AT ALL
3. WORRYING TOO MUCH ABOUT DIFFERENT THINGS: 1-SEVERAL DAYS
7. FEELING AFRAID AS IF SOMETHING AWFUL MIGHT HAPPEN: 1-SEVERAL DAYS
2. NOT BEING ABLE TO STOP OR CONTROL WORRYING: SEVERAL DAYS
5. BEING SO RESTLESS THAT IT IS HARD TO SIT STILL: 1-SEVERAL DAYS
GAD7 TOTAL SCORE: 4
4. TROUBLE RELAXING: NOT AT ALL
5. BEING SO RESTLESS THAT IT IS HARD TO SIT STILL: SEVERAL DAYS
6. BECOMING EASILY ANNOYED OR IRRITABLE: 0-NOT AT ALL
3. WORRYING TOO MUCH ABOUT DIFFERENT THINGS: SEVERAL DAYS
4. TROUBLE RELAXING: 0-NOT AT ALL
7. FEELING AFRAID AS IF SOMETHING AWFUL MIGHT HAPPEN: SEVERAL DAYS
1. FEELING NERVOUS, ANXIOUS, OR ON EDGE: 0-NOT AT ALL
6. BECOMING EASILY ANNOYED OR IRRITABLE: NOT AT ALL
2. NOT BEING ABLE TO STOP OR CONTROL WORRYING: 1-SEVERAL DAYS
GAD7 TOTAL SCORE: 0

## 2022-01-19 NOTE — PROGRESS NOTES
Reviewed patient questionnaire for anxiety/depression  Reviewed PHQ and MIR and interpreted. MIR 7 SCORE 1/19/2022 10/21/2021 7/21/2021   MIR-7 Total Score - 4 -   MIR-7 Total Score 4 - 17     Interpretation of MIR-7 score: 5-9 = mild anxiety, 10-14 = moderate anxiety, 15+ = severe anxiety. Recommend referral to behavioral health for scores 10 or greater. PHQ Scores 1/19/2022 7/21/2021   PHQ2 Score 1 5   PHQ9 Score 7 18     Interpretation of Total Score Depression Severity: 1-4 = Minimal depression, 5-9 = Mild depression, 10-14 = Moderate depression, 15-19 = Moderately severe depression, 20-27 = Severe depression      Per questionnaire, patient does not need refills or increased doses. Spent 5 to 10 minutes reviewing questionnaires as well as chart. Follow-up in 3 months.     Jo Cedeño MD  1/19/2022  5:41 PM

## 2022-01-27 ENCOUNTER — OFFICE VISIT (OUTPATIENT)
Dept: PRIMARY CARE CLINIC | Age: 27
End: 2022-01-27
Payer: COMMERCIAL

## 2022-01-27 VITALS
SYSTOLIC BLOOD PRESSURE: 107 MMHG | WEIGHT: 131.2 LBS | RESPIRATION RATE: 16 BRPM | HEART RATE: 66 BPM | DIASTOLIC BLOOD PRESSURE: 59 MMHG | TEMPERATURE: 98 F | BODY MASS INDEX: 24 KG/M2

## 2022-01-27 DIAGNOSIS — F41.1 GAD (GENERALIZED ANXIETY DISORDER): Primary | ICD-10-CM

## 2022-01-27 DIAGNOSIS — F33.1 MODERATE EPISODE OF RECURRENT MAJOR DEPRESSIVE DISORDER (HCC): ICD-10-CM

## 2022-01-27 PROCEDURE — 99214 OFFICE O/P EST MOD 30 MIN: CPT | Performed by: FAMILY MEDICINE

## 2022-01-27 RX ORDER — SERTRALINE HYDROCHLORIDE 100 MG/1
100 TABLET, FILM COATED ORAL DAILY
Qty: 90 TABLET | Refills: 3 | Status: SHIPPED | OUTPATIENT
Start: 2022-01-27 | End: 2022-06-12 | Stop reason: SDUPTHER

## 2022-01-27 ASSESSMENT — ENCOUNTER SYMPTOMS
CONSTIPATION: 0
DIARRHEA: 0
EYE PAIN: 0
SHORTNESS OF BREATH: 0
ABDOMINAL PAIN: 0
COUGH: 0

## 2022-01-27 NOTE — PROGRESS NOTES
Chief Complaint   Patient presents with    Medication Management         ASSESSMENT/PLAN:  1. MIR (generalized anxiety disorder)  Increase zoloft and follow up in 4 weeks to gauge improvement and continue to monitor. - sertraline (ZOLOFT) 100 MG tablet; Take 1 tablet by mouth daily  Dispense: 90 tablet; Refill: 3    2. Moderate episode of recurrent major depressive disorder (HCC)  Increase zoloft and follow up in 4 weeks to gauge improvement and continue to monitor. - sertraline (ZOLOFT) 100 MG tablet; Take 1 tablet by mouth daily  Dispense: 90 tablet; Refill: 3         HPI:  Ivonne Briggs is a 32 y.o. (: 1995) here today for mental health mgmt. She is feeling more \"blah\" and unmotivated lately.  finally agreed to try for another baby, something she has been pushing for, and now she is excited and stressed. She is finding overwhelming feelings creeping back in and her  notices as well. She is open to an increase in zoloft. Review of Systems   Constitutional: Negative for appetite change, chills, fatigue and fever. HENT: Negative for congestion. Eyes: Negative for pain and visual disturbance. Respiratory: Negative for cough and shortness of breath. Cardiovascular: Negative for chest pain and palpitations. Gastrointestinal: Negative for abdominal pain, constipation and diarrhea. Genitourinary: Negative for difficulty urinating. Musculoskeletal: Negative for arthralgias. Skin: Negative for rash and wound. Neurological: Negative for dizziness, weakness, light-headedness and headaches. Hematological: Does not bruise/bleed easily. Psychiatric/Behavioral: Negative for behavioral problems, sleep disturbance and suicidal ideas. The patient is nervous/anxious.         Past Medical History:   Diagnosis Date    Anxiety     Degenerative disc disease, lumbar     hx of bulging disk (cheerleader)    History of ADHD        Family History   Problem Relation Age of Onset  Heart Disease Father     Heart Attack Father         46 yr and 62 yr    Diabetes Maternal Grandmother     Cancer Paternal Grandmother         breast    Heart Disease Paternal Grandfather     Cancer Paternal Grandfather         melanoma    Dementia Paternal Grandfather        Social History     Tobacco Use    Smoking status: Never Smoker    Smokeless tobacco: Never Used   Substance Use Topics    Alcohol use: Yes     Comment: socially    Drug use: No       New Prescriptions    No medications on file       Meds Prior to visit:  Current Outpatient Medications on File Prior to Visit   Medication Sig Dispense Refill    levonorgestrel-ethinyl estradiol (DAVID-28) 0.15-30 MG-MCG per tablet Take 1 tablet by mouth daily 1 packet 11    Multiple Vitamins-Minerals (CENTRUM PO) Take  by mouth daily. No current facility-administered medications on file prior to visit. Allergies   Allergen Reactions    Cat Hair Extract     Sulfa Antibiotics        OBJECTIVE:  BP (!) 107/59   Pulse 66   Temp 98 °F (36.7 °C) (Temporal)   Resp 16   Wt 131 lb 3.2 oz (59.5 kg)   LMP 01/09/2022   Breastfeeding No   BMI 24.00 kg/m²   BP Readings from Last 2 Encounters:   01/27/22 (!) 107/59   10/19/21 107/75     Wt Readings from Last 3 Encounters:   01/27/22 131 lb 3.2 oz (59.5 kg)   10/19/21 125 lb (56.7 kg)   09/09/21 122 lb (55.3 kg)       Physical Exam  Vitals reviewed. Constitutional:       General: She is not in acute distress. Appearance: Normal appearance. She is normal weight. She is not ill-appearing. HENT:      Head: Normocephalic and atraumatic. Right Ear: Tympanic membrane, ear canal and external ear normal.      Left Ear: Tympanic membrane, ear canal and external ear normal.      Nose: Nose normal. No congestion. Mouth/Throat:      Mouth: Mucous membranes are moist.      Pharynx: Oropharynx is clear. No oropharyngeal exudate.    Eyes:      Extraocular Movements: Extraocular movements intact. Conjunctiva/sclera: Conjunctivae normal.      Pupils: Pupils are equal, round, and reactive to light. Cardiovascular:      Rate and Rhythm: Normal rate and regular rhythm. Pulses: Normal pulses. Heart sounds: Normal heart sounds. Pulmonary:      Effort: Pulmonary effort is normal. No respiratory distress. Breath sounds: Normal breath sounds. No stridor. No wheezing, rhonchi or rales. Abdominal:      General: Bowel sounds are normal.      Palpations: Abdomen is soft. Tenderness: There is no abdominal tenderness. Musculoskeletal:         General: Normal range of motion. Cervical back: Normal range of motion and neck supple. Right lower leg: No edema. Left lower leg: No edema. Skin:     General: Skin is warm. Capillary Refill: Capillary refill takes less than 2 seconds. Findings: No erythema or rash. Neurological:      General: No focal deficit present. Mental Status: She is alert and oriented to person, place, and time. Mental status is at baseline. Motor: No weakness. Coordination: Coordination normal.      Gait: Gait normal.      Deep Tendon Reflexes: Reflexes normal.   Psychiatric:         Mood and Affect: Mood normal.         Behavior: Behavior normal.         Thought Content: Thought content normal.         Judgment: Judgment normal.         Discussed use, benefit, and side effects of prescribed medications. Barriers to medication compliance addressed. All patient questions answered. Pt voiced understanding. RTC Return in about 6 weeks (around 3/10/2022).     Future Appointments   Date Time Provider Luis Blanco   1/28/2022  9:00 AM MD JEROD Calvillo [address] GYN JEROD Ch MD  1/27/2022  12:53 PM

## 2022-01-28 ENCOUNTER — OFFICE VISIT (OUTPATIENT)
Dept: GYNECOLOGY | Age: 27
End: 2022-01-28
Payer: COMMERCIAL

## 2022-01-28 VITALS
SYSTOLIC BLOOD PRESSURE: 110 MMHG | DIASTOLIC BLOOD PRESSURE: 72 MMHG | HEART RATE: 79 BPM | BODY MASS INDEX: 24.33 KG/M2 | WEIGHT: 133 LBS | TEMPERATURE: 97.9 F

## 2022-01-28 DIAGNOSIS — Z31.69 PRE-CONCEPTION COUNSELING: Primary | ICD-10-CM

## 2022-01-28 PROCEDURE — 99213 OFFICE O/P EST LOW 20 MIN: CPT | Performed by: OBSTETRICS & GYNECOLOGY

## 2022-01-28 NOTE — PROGRESS NOTES
pts interested in conceiving. She is planning to finish her pill pack and not start a new one. Advised her to start taking prenatal vitamins. She is concerned bc in the past when not on ocps she would develop ov cysts. I explained that having cysts may be normal and necessary in order to ovulate. She is planning to deliver at [facility]. I proposed seeing the [address] group at [facility]. All questions answered. 20 min >50% of time was spent discussing findings, management, and treatment options.

## 2022-03-24 ENCOUNTER — OFFICE VISIT (OUTPATIENT)
Dept: PRIMARY CARE CLINIC | Age: 27
End: 2022-03-24
Payer: COMMERCIAL

## 2022-03-24 VITALS
WEIGHT: 131.4 LBS | BODY MASS INDEX: 24.03 KG/M2 | DIASTOLIC BLOOD PRESSURE: 64 MMHG | TEMPERATURE: 98.2 F | HEART RATE: 65 BPM | SYSTOLIC BLOOD PRESSURE: 99 MMHG

## 2022-03-24 DIAGNOSIS — R10.2 SUPRAPUBIC PRESSURE: Primary | ICD-10-CM

## 2022-03-24 DIAGNOSIS — R19.7 DIARRHEA, UNSPECIFIED TYPE: ICD-10-CM

## 2022-03-24 LAB
BILIRUBIN, POC: NEGATIVE
BLOOD URINE, POC: NEGATIVE
CLARITY, POC: NORMAL
COLOR, POC: YELLOW
CONTROL: NORMAL
GLUCOSE URINE, POC: NEGATIVE
KETONES, POC: NEGATIVE
LEUKOCYTE EST, POC: NEGATIVE
NITRITE, POC: NEGATIVE
PH, POC: 6
PREGNANCY TEST URINE, POC: NEGATIVE
PROTEIN, POC: NEGATIVE
SPECIFIC GRAVITY, POC: 1.03
UROBILINOGEN, POC: 0.2

## 2022-03-24 PROCEDURE — 99213 OFFICE O/P EST LOW 20 MIN: CPT | Performed by: FAMILY MEDICINE

## 2022-03-24 PROCEDURE — 81002 URINALYSIS NONAUTO W/O SCOPE: CPT | Performed by: FAMILY MEDICINE

## 2022-03-24 PROCEDURE — 81025 URINE PREGNANCY TEST: CPT | Performed by: FAMILY MEDICINE

## 2022-03-24 ASSESSMENT — ENCOUNTER SYMPTOMS
CONSTIPATION: 0
ABDOMINAL PAIN: 1
SHORTNESS OF BREATH: 0
EYE PAIN: 0
DIARRHEA: 1
NAUSEA: 1
COUGH: 0

## 2022-03-24 NOTE — PROGRESS NOTES
Chief Complaint   Patient presents with    Back Pain     no urinary sx. does have pain (with pressure) on lower abd          ASSESSMENT/PLAN:  1. Suprapubic pressure  Neg preg test  Urine dip WNL  Likely enteritis from alc use  Will continue with bland diet and follow up PRN  Discussed RTC precautions and all questions answered. - POCT Urinalysis no Micro  - POCT urine pregnancy    2. Diarrhea, unspecified type  Likely secondary to enteritis from increased alcohol use over the past weekend  No blood in stool, fevers or chills. Mild nausea but able to tolerate food and drink  We will keep diet bland and follow-up as needed  Discussed return to clinic precautions and all questions answered         HPI:  Alfonzo Ruano is a 32 y.o. (: 1995) here today for lower abdominal discomfort. Started about 3 days ago and is stable, no worse and no better. Stopped birth control before last period so is wondering if she is pregnant. No urinary symptoms. Is experiencing some chronic lower back pain but this is nothing new. Has some nausea as well. Patient's last menstrual period was 2022. Has been drinking more alcohol lately given some celebrations and this is when her diarrhea started. Nonbloody. Able to tolerate food and drink. No fevers or chills. Has not eaten anything questionable or something that she normally does not eat. Review of Systems   Constitutional: Negative for appetite change, chills, fatigue and fever. HENT: Negative for congestion. Eyes: Negative for pain and visual disturbance. Respiratory: Negative for cough and shortness of breath. Cardiovascular: Negative for chest pain and palpitations. Gastrointestinal: Positive for abdominal pain, diarrhea and nausea. Negative for constipation. Genitourinary: Negative for difficulty urinating and menstrual problem. Musculoskeletal: Negative for arthralgias. Skin: Negative for rash and wound.    Neurological: Negative for dizziness, weakness, light-headedness and headaches. Hematological: Does not bruise/bleed easily. Psychiatric/Behavioral: Negative for behavioral problems. Past Medical History:   Diagnosis Date    Anxiety     Degenerative disc disease, lumbar     hx of bulging disk (cheerleader)    History of ADHD     [facility] not elsewhere [facility] 9/23/2014       Family History   Problem Relation Age of Onset    Heart Disease Father     Heart Attack Father         46 yr and 62 yr    Diabetes Maternal Grandmother     Cancer Paternal Grandmother         breast    Heart Disease Paternal Grandfather     Cancer Paternal Grandfather         melanoma    Dementia Paternal Grandfather        Social History     Tobacco Use    Smoking status: Never Smoker    Smokeless tobacco: Never Used   Substance Use Topics    Alcohol use: Yes     Comment: socially    Drug use: No       New Prescriptions    No medications on file       Meds Prior to visit:  Current Outpatient Medications on File Prior to Visit   Medication Sig Dispense Refill    sertraline (ZOLOFT) 100 MG tablet Take 1 tablet by mouth daily 90 tablet 3    Multiple Vitamins-Minerals (CENTRUM PO) Take  by mouth daily. No current facility-administered medications on file prior to visit. Allergies   Allergen Reactions    Cat Hair Extract     Sulfa Antibiotics        OBJECTIVE:  BP 99/64   Pulse 65   Temp 98.2 °F (36.8 °C) (Temporal)   Wt 131 lb 6.4 oz (59.6 kg)   LMP 03/06/2022   BMI 24.03 kg/m²   BP Readings from Last 2 Encounters:   03/24/22 99/64   01/28/22 110/72     Wt Readings from Last 3 Encounters:   03/24/22 131 lb 6.4 oz (59.6 kg)   01/28/22 133 lb (60.3 kg)   01/27/22 131 lb 3.2 oz (59.5 kg)       Physical Exam  Vitals reviewed. Constitutional:       General: She is not in acute distress. Appearance: Normal appearance. She is not ill-appearing. HENT:      Head: Normocephalic and atraumatic.       Right Ear: External ear normal.      Left Ear: External ear normal.      Nose: Nose normal. No congestion. Mouth/Throat:      Mouth: Mucous membranes are moist.      Pharynx: Oropharynx is clear. No oropharyngeal exudate. Eyes:      Extraocular Movements: Extraocular movements intact. Conjunctiva/sclera: Conjunctivae normal.      Pupils: Pupils are equal, round, and reactive to light. Cardiovascular:      Rate and Rhythm: Normal rate and regular rhythm. Pulses: Normal pulses. Heart sounds: Normal heart sounds. Pulmonary:      Effort: Pulmonary effort is normal. No respiratory distress. Breath sounds: Normal breath sounds. No stridor. No wheezing, rhonchi or rales. Abdominal:      General: Bowel sounds are normal.      Palpations: Abdomen is soft. Tenderness: There is no abdominal tenderness. Musculoskeletal:         General: Normal range of motion. Cervical back: Normal range of motion and neck supple. Right lower leg: No edema. Left lower leg: No edema. Skin:     General: Skin is warm. Capillary Refill: Capillary refill takes less than 2 seconds. Findings: No erythema or rash. Neurological:      General: No focal deficit present. Mental Status: She is alert and oriented to person, place, and time. Mental status is at baseline. Motor: No weakness. Coordination: Coordination normal.      Gait: Gait normal.   Psychiatric:         Mood and Affect: Mood normal.         Behavior: Behavior normal.         Thought Content: Thought content normal.         Judgment: Judgment normal.         Discussed use, benefit, and side effects of prescribed medications. Barriers to medication compliance addressed. All patient questions answered. Pt voiced understanding. RTC No follow-ups on file. No future appointments.     Jeff Irwin MD  3/24/2022  9:56 AM

## 2022-06-12 DIAGNOSIS — F41.1 GAD (GENERALIZED ANXIETY DISORDER): ICD-10-CM

## 2022-06-12 DIAGNOSIS — F33.1 MODERATE EPISODE OF RECURRENT MAJOR DEPRESSIVE DISORDER (HCC): ICD-10-CM

## 2022-06-13 RX ORDER — SERTRALINE HYDROCHLORIDE 100 MG/1
100 TABLET, FILM COATED ORAL DAILY
Qty: 90 TABLET | Refills: 3 | Status: SHIPPED | OUTPATIENT
Start: 2022-06-13 | End: 2022-09-09 | Stop reason: SDUPTHER

## 2022-06-13 NOTE — TELEPHONE ENCOUNTER
Medication:   Requested Prescriptions     Pending Prescriptions Disp Refills    sertraline (ZOLOFT) 100 MG tablet 90 tablet 3     Sig: Take 1 tablet by mouth daily        Last Filled:      Patient Phone Number: [phone] (home)     Last appt: 3/24/2022   Next appt: Visit date not found    Last OARRS: No flowsheet data found.

## 2022-09-09 DIAGNOSIS — F33.1 MODERATE EPISODE OF RECURRENT MAJOR DEPRESSIVE DISORDER (HCC): ICD-10-CM

## 2022-09-09 DIAGNOSIS — F41.1 GAD (GENERALIZED ANXIETY DISORDER): ICD-10-CM

## 2022-09-09 RX ORDER — SERTRALINE HYDROCHLORIDE 100 MG/1
100 TABLET, FILM COATED ORAL DAILY
Qty: 90 TABLET | Refills: 3 | Status: SHIPPED | OUTPATIENT
Start: 2022-09-09 | End: 2022-12-08

## 2022-09-09 NOTE — TELEPHONE ENCOUNTER
Medication:   Requested Prescriptions     Pending Prescriptions Disp Refills    sertraline (ZOLOFT) 100 MG tablet 90 tablet 3     Sig: Take 1 tablet by mouth daily        Last Filled:  6/13/22    Patient Phone Number: [phone] (home)     Last appt: 3/24/2022   Next appt: Visit date not found    Last OARRS: No flowsheet data found.

## 2022-12-05 DIAGNOSIS — F41.1 GAD (GENERALIZED ANXIETY DISORDER): ICD-10-CM

## 2022-12-05 DIAGNOSIS — F33.1 MODERATE EPISODE OF RECURRENT MAJOR DEPRESSIVE DISORDER (HCC): ICD-10-CM

## 2022-12-05 RX ORDER — SERTRALINE HYDROCHLORIDE 100 MG/1
100 TABLET, FILM COATED ORAL DAILY
Qty: 90 TABLET | Refills: 3 | Status: SHIPPED | OUTPATIENT
Start: 2022-12-05 | End: 2023-03-05

## 2022-12-05 NOTE — TELEPHONE ENCOUNTER
Medication:   Requested Prescriptions     Pending Prescriptions Disp Refills    sertraline (ZOLOFT) 100 MG tablet 90 tablet 3     Sig: Take 1 tablet by mouth daily        Last Filled:  9/9/22    Patient Phone Number: [phone] (home)     Last appt: 3/24/2022   Next appt: Visit date not found    Last OARRS: No flowsheet data found.

## 2023-03-13 DIAGNOSIS — F41.1 GAD (GENERALIZED ANXIETY DISORDER): ICD-10-CM

## 2023-03-13 DIAGNOSIS — F33.1 MODERATE EPISODE OF RECURRENT MAJOR DEPRESSIVE DISORDER (HCC): ICD-10-CM

## 2023-03-13 RX ORDER — SERTRALINE HYDROCHLORIDE 100 MG/1
100 TABLET, FILM COATED ORAL DAILY
Qty: 90 TABLET | Refills: 3 | Status: SHIPPED | OUTPATIENT
Start: 2023-03-13 | End: 2023-06-11

## 2023-03-13 NOTE — TELEPHONE ENCOUNTER
Medication:   Requested Prescriptions     Pending Prescriptions Disp Refills    sertraline (ZOLOFT) 100 MG tablet 90 tablet 3     Sig: Take 1 tablet by mouth daily        Last Filled:  12/5/22    Patient Phone Number: [phone] (home)     Last appt: 3/24/2022   Next appt: Visit date not found    Last OARRS: No flowsheet data found.

## 2024-03-15 DIAGNOSIS — F41.1 GAD (GENERALIZED ANXIETY DISORDER): ICD-10-CM

## 2024-03-15 DIAGNOSIS — F33.1 MODERATE EPISODE OF RECURRENT MAJOR DEPRESSIVE DISORDER (HCC): ICD-10-CM

## 2024-03-15 RX ORDER — SERTRALINE HYDROCHLORIDE 100 MG/1
100 TABLET, FILM COATED ORAL DAILY
Qty: 90 TABLET | Refills: 3 | OUTPATIENT
Start: 2024-03-15

## 2024-03-15 NOTE — TELEPHONE ENCOUNTER
Medication:   Requested Prescriptions     Pending Prescriptions Disp Refills    sertraline (ZOLOFT) 100 MG tablet [Pharmacy Med Name: SERTRALINE  MG TABLET] 90 tablet 3     Sig: TAKE ONE TABLET BY MOUTH DAILY        Last Filled:  3/13/23    Patient Phone Number: [phone] (home)     Last appt: 3/24/2022   Next appt: Visit date not found    Last OARRS:        No data to display